# Patient Record
Sex: FEMALE | Race: WHITE | NOT HISPANIC OR LATINO | Employment: FULL TIME | URBAN - METROPOLITAN AREA
[De-identification: names, ages, dates, MRNs, and addresses within clinical notes are randomized per-mention and may not be internally consistent; named-entity substitution may affect disease eponyms.]

---

## 2022-11-16 ENCOUNTER — HOSPITAL ENCOUNTER (EMERGENCY)
Facility: HOSPITAL | Age: 32
Discharge: HOME | End: 2022-11-16
Attending: EMERGENCY MEDICINE | Admitting: EMERGENCY MEDICINE

## 2022-11-16 VITALS
HEIGHT: 64 IN | HEART RATE: 90 BPM | OXYGEN SATURATION: 97 % | RESPIRATION RATE: 18 BRPM | SYSTOLIC BLOOD PRESSURE: 139 MMHG | DIASTOLIC BLOOD PRESSURE: 104 MMHG | TEMPERATURE: 98.1 F | BODY MASS INDEX: 29.88 KG/M2 | WEIGHT: 175 LBS

## 2022-11-16 DIAGNOSIS — R10.9 NONSPECIFIC ABDOMINAL PAIN: Primary | ICD-10-CM

## 2022-11-16 DIAGNOSIS — N39.0 ACUTE UTI: ICD-10-CM

## 2022-11-16 DIAGNOSIS — R03.0 ELEVATED BLOOD PRESSURE READING: ICD-10-CM

## 2022-11-16 LAB
ALBUMIN SERPL-MCNC: 4.8 G/DL (ref 3.5–5.2)
ALBUMIN/GLOB SERPL: 1.5 G/DL
ALP SERPL-CCNC: 74 U/L (ref 39–117)
ALT SERPL W P-5'-P-CCNC: 43 U/L (ref 1–33)
ANION GAP SERPL CALCULATED.3IONS-SCNC: 13 MMOL/L (ref 5–15)
AST SERPL-CCNC: 35 U/L (ref 1–32)
B-HCG UR QL: ABNORMAL
BACTERIA UR QL AUTO: ABNORMAL /HPF
BASOPHILS # BLD AUTO: 0.03 10*3/MM3 (ref 0–0.2)
BASOPHILS NFR BLD AUTO: 0.5 % (ref 0–1.5)
BILIRUB SERPL-MCNC: 0.5 MG/DL (ref 0–1.2)
BILIRUB UR QL STRIP: NEGATIVE
BUN SERPL-MCNC: 10 MG/DL (ref 6–20)
BUN/CREAT SERPL: 8.4 (ref 7–25)
CALCIUM SPEC-SCNC: 9.7 MG/DL (ref 8.6–10.5)
CHLORIDE SERPL-SCNC: 99 MMOL/L (ref 98–107)
CLARITY UR: ABNORMAL
CO2 SERPL-SCNC: 27 MMOL/L (ref 22–29)
COLOR UR: YELLOW
CREAT SERPL-MCNC: 1.19 MG/DL (ref 0.57–1)
D-LACTATE SERPL-SCNC: 1.1 MMOL/L (ref 0.5–2)
DEPRECATED RDW RBC AUTO: 39.2 FL (ref 37–54)
DEVELOPER EXPIRATION DATE: NORMAL
DEVELOPER LOT NUMBER: NORMAL
EGFRCR SERPLBLD CKD-EPI 2021: 62 ML/MIN/1.73
EOSINOPHIL # BLD AUTO: 0.04 10*3/MM3 (ref 0–0.4)
EOSINOPHIL NFR BLD AUTO: 0.6 % (ref 0.3–6.2)
ERYTHROCYTE [DISTWIDTH] IN BLOOD BY AUTOMATED COUNT: 12.3 % (ref 12.3–15.4)
EXPIRATION DATE: ABNORMAL
EXPIRATION DATE: NORMAL
FECAL OCCULT BLOOD SCREEN, POC: NEGATIVE
GLOBULIN UR ELPH-MCNC: 3.3 GM/DL
GLUCOSE SERPL-MCNC: 110 MG/DL (ref 65–99)
GLUCOSE UR STRIP-MCNC: NEGATIVE MG/DL
HCT VFR BLD AUTO: 39.5 % (ref 34–46.6)
HGB BLD-MCNC: 13 G/DL (ref 12–15.9)
HGB UR QL STRIP.AUTO: ABNORMAL
HOLD SPECIMEN: NORMAL
HYALINE CASTS UR QL AUTO: ABNORMAL /LPF
IMM GRANULOCYTES # BLD AUTO: 0.01 10*3/MM3 (ref 0–0.05)
IMM GRANULOCYTES NFR BLD AUTO: 0.2 % (ref 0–0.5)
INTERNAL NEGATIVE CONTROL: ABNORMAL
INTERNAL POSITIVE CONTROL: ABNORMAL
KETONES UR QL STRIP: ABNORMAL
LEUKOCYTE ESTERASE UR QL STRIP.AUTO: ABNORMAL
LIPASE SERPL-CCNC: 22 U/L (ref 13–60)
LYMPHOCYTES # BLD AUTO: 2.34 10*3/MM3 (ref 0.7–3.1)
LYMPHOCYTES NFR BLD AUTO: 37.4 % (ref 19.6–45.3)
Lab: ABNORMAL
Lab: NORMAL
MCH RBC QN AUTO: 28.5 PG (ref 26.6–33)
MCHC RBC AUTO-ENTMCNC: 32.9 G/DL (ref 31.5–35.7)
MCV RBC AUTO: 86.6 FL (ref 79–97)
MONOCYTES # BLD AUTO: 0.47 10*3/MM3 (ref 0.1–0.9)
MONOCYTES NFR BLD AUTO: 7.5 % (ref 5–12)
NEGATIVE CONTROL: NEGATIVE
NEUTROPHILS NFR BLD AUTO: 3.37 10*3/MM3 (ref 1.7–7)
NEUTROPHILS NFR BLD AUTO: 53.8 % (ref 42.7–76)
NITRITE UR QL STRIP: NEGATIVE
NRBC BLD AUTO-RTO: 0 /100 WBC (ref 0–0.2)
PH UR STRIP.AUTO: 5.5 [PH] (ref 5–8)
PLATELET # BLD AUTO: 260 10*3/MM3 (ref 140–450)
PMV BLD AUTO: 10.1 FL (ref 6–12)
POSITIVE CONTROL: POSITIVE
POTASSIUM SERPL-SCNC: 4 MMOL/L (ref 3.5–5.2)
PROT SERPL-MCNC: 8.1 G/DL (ref 6–8.5)
PROT UR QL STRIP: NEGATIVE
RBC # BLD AUTO: 4.56 10*6/MM3 (ref 3.77–5.28)
RBC # UR STRIP: ABNORMAL /HPF
REF LAB TEST METHOD: ABNORMAL
SODIUM SERPL-SCNC: 139 MMOL/L (ref 136–145)
SP GR UR STRIP: 1.02 (ref 1–1.03)
SQUAMOUS #/AREA URNS HPF: ABNORMAL /HPF
UROBILINOGEN UR QL STRIP: ABNORMAL
WBC # UR STRIP: ABNORMAL /HPF
WBC NRBC COR # BLD: 6.26 10*3/MM3 (ref 3.4–10.8)
WHOLE BLOOD HOLD COAG: NORMAL
WHOLE BLOOD HOLD SPECIMEN: NORMAL

## 2022-11-16 PROCEDURE — 82270 OCCULT BLOOD FECES: CPT | Performed by: EMERGENCY MEDICINE

## 2022-11-16 PROCEDURE — 85025 COMPLETE CBC W/AUTO DIFF WBC: CPT | Performed by: EMERGENCY MEDICINE

## 2022-11-16 PROCEDURE — 83605 ASSAY OF LACTIC ACID: CPT | Performed by: EMERGENCY MEDICINE

## 2022-11-16 PROCEDURE — 80053 COMPREHEN METABOLIC PANEL: CPT | Performed by: EMERGENCY MEDICINE

## 2022-11-16 PROCEDURE — 81001 URINALYSIS AUTO W/SCOPE: CPT | Performed by: EMERGENCY MEDICINE

## 2022-11-16 PROCEDURE — 99284 EMERGENCY DEPT VISIT MOD MDM: CPT

## 2022-11-16 PROCEDURE — 96374 THER/PROPH/DIAG INJ IV PUSH: CPT

## 2022-11-16 PROCEDURE — 81025 URINE PREGNANCY TEST: CPT | Performed by: EMERGENCY MEDICINE

## 2022-11-16 PROCEDURE — 83690 ASSAY OF LIPASE: CPT | Performed by: EMERGENCY MEDICINE

## 2022-11-16 RX ORDER — SODIUM CHLORIDE 9 MG/ML
10 INJECTION INTRAVENOUS AS NEEDED
Status: DISCONTINUED | OUTPATIENT
Start: 2022-11-16 | End: 2022-11-16 | Stop reason: HOSPADM

## 2022-11-16 RX ORDER — PANTOPRAZOLE SODIUM 40 MG/10ML
40 INJECTION, POWDER, LYOPHILIZED, FOR SOLUTION INTRAVENOUS ONCE
Status: COMPLETED | OUTPATIENT
Start: 2022-11-16 | End: 2022-11-16

## 2022-11-16 RX ORDER — ONDANSETRON 4 MG/1
4 TABLET, ORALLY DISINTEGRATING ORAL EVERY 6 HOURS PRN
Qty: 15 TABLET | Refills: 0 | Status: SHIPPED | OUTPATIENT
Start: 2022-11-16

## 2022-11-16 RX ORDER — CEFDINIR 300 MG/1
300 CAPSULE ORAL 2 TIMES DAILY
Qty: 10 CAPSULE | Refills: 0 | Status: SHIPPED | OUTPATIENT
Start: 2022-11-16

## 2022-11-16 RX ORDER — FAMOTIDINE 20 MG/1
20 TABLET, FILM COATED ORAL 2 TIMES DAILY
Qty: 30 TABLET | Refills: 0 | Status: SHIPPED | OUTPATIENT
Start: 2022-11-16

## 2022-11-16 RX ORDER — LIDOCAINE HYDROCHLORIDE 20 MG/ML
15 SOLUTION OROPHARYNGEAL ONCE
Status: COMPLETED | OUTPATIENT
Start: 2022-11-16 | End: 2022-11-16

## 2022-11-16 RX ADMIN — ALUMINUM HYDROXIDE, MAGNESIUM HYDROXIDE, AND DIMETHICONE: 400; 400; 40 SUSPENSION ORAL at 16:55

## 2022-11-16 RX ADMIN — PANTOPRAZOLE SODIUM 40 MG: 40 INJECTION, POWDER, FOR SOLUTION INTRAVENOUS at 16:49

## 2022-11-16 RX ADMIN — LIDOCAINE HYDROCHLORIDE 15 ML: 20 SOLUTION TOPICAL at 16:55

## 2022-11-16 NOTE — ED PROVIDER NOTES
EMERGENCY DEPARTMENT ENCOUNTER    Pt Name: Anabelle Hernandez  MRN: [id number]  Pt :   1990  Room Number:    Date of encounter:  2022  PCP: Aide Tipton DO  ED Provider: Arie Boateng MD    Historian: Patient      HPI:  Chief Complaint: Abdominal pain        Context: Anabelle Hernandez is a 31 y.o. female who presents to the ED c/o diffuse abdominal pain which has been intermittent, lasting at the absolute most for 20 minutes at a time.  The patient was seen by her PCP who ordered an abdominal x-ray.  Yesterday she went to [facility] emergency department where a CT scan of the abdomen pelvis with IV contrast was performed and no abnormalities were found.  The patient reports some mildly blackish stools at times.  She has felt as if she was constipated and tried a couple doses of MiraLAX and then a suppository.  After that she did have some stool output and felt better for a short period.  The patient notes increased discomfort if she eats anything other than goldfish.  She denies fevers, chills.  She has been nauseated and vomits after eating.  She has had no previous abdominal surgeries.  She was able to eat a chocolate pancake for breakfast.  Patient does report urinary frequency but no dysuria.  The patient was diagnosed with COVID within the last week roughly 6 days ago.    PAST MEDICAL HISTORY  No past medical history on file.      PAST SURGICAL HISTORY  No past surgical history on file.      FAMILY HISTORY  No family history on file.      SOCIAL HISTORY  Social History     Socioeconomic History   • Marital status:          ALLERGIES  Patient has no known allergies.        REVIEW OF SYSTEMS  Review of Systems       All systems reviewed and negative except for those discussed in HPI.       PHYSICAL EXAM    I have reviewed the triage vital signs and nursing notes.    ED Triage Vitals [22 1428]   Temp Heart Rate Resp BP SpO2   98.1 °F (36.7 °C) 80 20 (!) 160/116 100 %       Temp src Heart Rate Source Patient Position BP Location FiO2 (%)   Oral Monitor Sitting Left arm --       Physical Exam  GENERAL:   Appears pleasant, nontoxic.  Appears in no distress.  HENT: Nares patent.  EYES: No scleral icterus.  CV: Regular rhythm, regular rate.  No murmurs gallops rubs  RESPIRATORY: Normal effort.  No audible wheezes, rales or rhonchi.  Clear to auscultation  ABDOMEN: Soft, nontender to even deep palpation throughout the entire abdomen.  Bowel sounds are within normal limits.  There is not even a hint of tenderness with deep palpation.  MUSCULOSKELETAL: No deformities.   NEURO: Alert, moves all extremities, follows commands.  SKIN: Warm, dry, no rash visualized.        LAB RESULTS  Recent Results (from the past 24 hour(s))   Comprehensive Metabolic Panel    Collection Time: 11/16/22  2:43 PM    Specimen: Blood   Result Value Ref Range    Glucose 110 (H) 65 - 99 mg/dL    BUN 10 6 - 20 mg/dL    Creatinine 1.19 (H) 0.57 - 1.00 mg/dL    Sodium 139 136 - 145 mmol/L    Potassium 4.0 3.5 - 5.2 mmol/L    Chloride 99 98 - 107 mmol/L    CO2 27.0 22.0 - 29.0 mmol/L    Calcium 9.7 8.6 - 10.5 mg/dL    Total Protein 8.1 6.0 - 8.5 g/dL    Albumin 4.80 3.50 - 5.20 g/dL    ALT (SGPT) 43 (H) 1 - 33 U/L    AST (SGOT) 35 (H) 1 - 32 U/L    Alkaline Phosphatase 74 39 - 117 U/L    Total Bilirubin 0.5 0.0 - 1.2 mg/dL    Globulin 3.3 gm/dL    A/G Ratio 1.5 g/dL    BUN/Creatinine Ratio 8.4 7.0 - 25.0    Anion Gap 13.0 5.0 - 15.0 mmol/L    eGFR 62.0 >60.0 mL/min/1.73   Lipase    Collection Time: 11/16/22  2:43 PM    Specimen: Blood   Result Value Ref Range    Lipase 22 13 - 60 U/L   Green Top (Gel)    Collection Time: 11/16/22  2:43 PM   Result Value Ref Range    Extra Tube Hold for add-ons.    Lavender Top    Collection Time: 11/16/22  2:43 PM   Result Value Ref Range    Extra Tube hold for add-on    Gold Top - SST    Collection Time: 11/16/22  2:43 PM   Result Value Ref Range    Extra Tube Hold for add-ons.    Light  Blue Top    Collection Time: 11/16/22  2:43 PM   Result Value Ref Range    Extra Tube Hold for add-ons.    CBC Auto Differential    Collection Time: 11/16/22  2:43 PM    Specimen: Blood   Result Value Ref Range    WBC 6.26 3.40 - 10.80 10*3/mm3    RBC 4.56 3.77 - 5.28 10*6/mm3    Hemoglobin 13.0 12.0 - 15.9 g/dL    Hematocrit 39.5 34.0 - 46.6 %    MCV 86.6 79.0 - 97.0 fL    MCH 28.5 26.6 - 33.0 pg    MCHC 32.9 31.5 - 35.7 g/dL    RDW 12.3 12.3 - 15.4 %    RDW-SD 39.2 37.0 - 54.0 fl    MPV 10.1 6.0 - 12.0 fL    Platelets 260 140 - 450 10*3/mm3    Neutrophil % 53.8 42.7 - 76.0 %    Lymphocyte % 37.4 19.6 - 45.3 %    Monocyte % 7.5 5.0 - 12.0 %    Eosinophil % 0.6 0.3 - 6.2 %    Basophil % 0.5 0.0 - 1.5 %    Immature Grans % 0.2 0.0 - 0.5 %    Neutrophils, Absolute 3.37 1.70 - 7.00 10*3/mm3    Lymphocytes, Absolute 2.34 0.70 - 3.10 10*3/mm3    Monocytes, Absolute 0.47 0.10 - 0.90 10*3/mm3    Eosinophils, Absolute 0.04 0.00 - 0.40 10*3/mm3    Basophils, Absolute 0.03 0.00 - 0.20 10*3/mm3    Immature Grans, Absolute 0.01 0.00 - 0.05 10*3/mm3    nRBC 0.0 0.0 - 0.2 /100 WBC   Lactic Acid, Plasma    Collection Time: 11/16/22  2:43 PM    Specimen: Blood   Result Value Ref Range    Lactate 1.1 0.5 - 2.0 mmol/L   Urinalysis With Microscopic If Indicated (No Culture) - Urine, Clean Catch    Collection Time: 11/16/22  2:46 PM    Specimen: Urine, Clean Catch   Result Value Ref Range    Color, UA Yellow Yellow, Straw    Appearance, UA Cloudy (A) Clear    pH, UA 5.5 5.0 - 8.0    Specific Gravity, UA 1.016 1.001 - 1.030    Glucose, UA Negative Negative    Ketones, UA Trace (A) Negative    Bilirubin, UA Negative Negative    Blood, UA Moderate (2+) (A) Negative    Protein, UA Negative Negative    Leuk Esterase, UA Moderate (2+) (A) Negative    Nitrite, UA Negative Negative    Urobilinogen, UA 0.2 E.U./dL 0.2 - 1.0 E.U./dL   Urinalysis, Microscopic Only - Urine, Clean Catch    Collection Time: 11/16/22  2:46 PM    Specimen: Urine,  Clean Catch   Result Value Ref Range    RBC, UA 0-2 None Seen, 0-2 /HPF    WBC, UA 31-50 (A) None Seen, 0-2 /HPF    Bacteria, UA 2+ (A) None Seen, Trace /HPF    Squamous Epithelial Cells, UA 0-2 None Seen, 0-2 /HPF    Hyaline Casts, UA 0-6 0 - 6 /LPF    Methodology Manual Light Microscopy    POC Urine Pregnancy    Collection Time: 11/16/22  2:52 PM    Specimen: Urine   Result Value Ref Range    HCG, Urine, QL (NEGATIVE)     Lot Number [id number]     Internal Positive Control Passed Positive, Passed    Internal Negative Control Passed Negative, Passed    Expiration Date 20,231,130    POC Occult Blood Stool    Collection Time: 11/16/22  3:59 PM    Specimen: Per Rectum; Stool   Result Value Ref Range    Fecal Occult Blood Negative Negative    Lot Number [id number]     Expiration Date 8/2,025     DEVELOPER LOT NUMBER [id number]     DEVELOPER EXPIRATION DATE 5/2,026     Positive Control Positive Positive    Negative Control Negative Negative       If labs were ordered, I independently reviewed the results.        RADIOLOGY  CT Abdomen Pelvis With Contrast    Result Date: 11/15/2022  CT SCANS ABDOMEN AND PELVIS WITH CONTRAST HISTORY: Abdomen pain for 2 days COMPARISON: None. TECHNIQUE: Radiation dose reduction techniques were utilized, including automated exposure control and exposure modulation based on body size. Axial images were obtained from the lung bases to the symphysis pubis with IV contrast only.No oral Contrast per request.  Lack of oral contrast limits GI tract evaluation. FINDINGS:  Normal abdominal organs, aorta, appendix. No constipation or diverticular disease. The GI tract was not opacified for assessment but is nonobstructive in appearance. Uterus and bilateral adnexa unremarkable by CT. No ascites. IMPRESSION : 1. No acute inflammatory process      I ordered and reviewed the above noted radiographic studies.      See radiologist's dictation for official interpretation.        PROCEDURES    Procedures    No  orders to display       MEDICATIONS GIVEN IN ER    Medications   Sodium Chloride (PF) 0.9 % 10 mL (has no administration in time range)   aluminum-magnesium hydroxide-simethicone (MAALOX MAX) 400-400-40 MG/5ML 15 mL suspension ( Oral Given 11/16/22 1655)   pantoprazole (PROTONIX) injection 40 mg (40 mg Intravenous Given 11/16/22 1649)   Lidocaine Viscous HCl (XYLOCAINE) 2 % solution 15 mL (15 mL Mouth/Throat Given 11/16/22 1655)         PROGRESS, DATA ANALYSIS, CONSULTS, AND MEDICAL DECISION MAKING    All labs have been independently reviewed by me.  All radiology studies have been reviewed by me and the radiologist dictating the report.   EKG's have been independently viewed and interpreted by me.      Differential diagnoses: Diffuse abdominal pain.  Consider gastritis, peptic ulcer disease, duodenitis, pancreatitis, UTI, intestinal ischemia, constipation, intestinal colic, etc. etc.      ED Course as of 11/16/22 1730   Wed Nov 16, 2022   1721 Repeat abdominal exam with deep palpation shows no tenderness whatsoever at this point.  We have administered a GI cocktail and IV Protonix.  I confirmed that the patient has not been taking Pepcid and used to have problems with gastritis.  She has been taking ibuprofen.  I think it is very reasonable to discharge with outpatient referral to gastroenterology.  I spoke with the patient about risks and benefits of repeat CT scanning and she is in full agreement with me as far as risk being greater than benefit in this case with a normal white blood cell, negative stool guaiac, normal lactic acid.  She is appreciative of care. [MS]   1727 I reviewed the patient's CT scan with IV contrast from yesterday's visit in the emergency department.  I see that no acute abnormalities were seen.  Her white blood cell count yesterday was 6000 as well.  I reviewed multiple other records.  I do not see evidence of UTI on yesterday's lab work but today's is concerning for UTI.  Given her  urinary frequency I will treat her for this as well. [MS]      ED Course User Index  [MS] Arie Boateng MD             AS OF 17:30 EST VITALS:    BP - 131/100  HR - 78  TEMP - 98.1 °F (36.7 °C) (Oral)  O2 SATS - 99%                  DIAGNOSIS  Final diagnoses:   Nonspecific abdominal pain   Acute UTI   Elevated blood pressure reading         DISPOSITION  DISCHARGE    Patient discharged in stable condition.    Reviewed implications of results, diagnosis, meds, responsibility to follow up, warning signs and symptoms of possible worsening, potential complications and reasons to return to ER.    Patient/Family voiced understanding of above instructions.    Discussed plan for discharge, as there is no emergent indication for admission.  Pt/family is agreeable and understands need for follow up and possible repeat testing.  Pt/family is aware that discharge does not mean that nothing is wrong but that it indicates no emergency is currently present that requires admission and they must continue care with follow-up as given below or with a physician of their choice.     FOLLOW-UP  Last Franco MD  [address] [facility] [address]  [facility] [id number] [phone]      NEXT AVAILABLE APPOINTMENT - RECHECK OF CONDITION    Aide Tipton, DO  [address] [id number]  [phone]      NEXT AVAILABLE APPOINTMENT - RECHECK OF CONDITION    [facility]  [address] [facility] [address] [phone]    IF YOU HAVE ANY CONCERN OF WORSENING CONDITION         Medication List      New Prescriptions    cefdinir 300 MG capsule  Commonly known as: OMNICEF  Take 1 capsule by mouth 2 (Two) Times a Day.     famotidine 20 MG tablet  Commonly known as: PEPCID  Take 1 tablet by mouth 2 (Two) Times a Day.     ondansetron ODT 4 MG disintegrating tablet  Commonly known as: ZOFRAN-ODT  Place 1 tablet on the tongue Every 6 (Six) Hours As Needed for Nausea or  Vomiting. As needed for nausea           Where to Get Your Medications      These medications were sent to [facility] [address] [place] [address] - [address] [facility] [address] - [phone]  - [phone] [address] [address]    Phone: [phone]   · cefdinir 300 MG capsule  · famotidine 20 MG tablet  · ondansetron ODT 4 MG disintegrating tablet                  Arie Boateng MD  11/16/22 6650

## 2022-11-16 NOTE — DISCHARGE INSTRUCTIONS
Push fluids.  Avoid dairy, caffeine, NSAIDs (ibuprofen like medicine).    Take MiraLAX twice a day for 2 days and then as needed if you feel you are becoming constipated.    Follow-up with on-call gastroenterology as I have referred you.  You will need to call their office to schedule an appointment.    Take Pepcid twice a day ongoing.  Take Zofran as needed for nausea.    If you have any concern of worsening condition please return to the emergency department.